# Patient Record
Sex: FEMALE | URBAN - METROPOLITAN AREA
[De-identification: names, ages, dates, MRNs, and addresses within clinical notes are randomized per-mention and may not be internally consistent; named-entity substitution may affect disease eponyms.]

---

## 2020-04-16 ENCOUNTER — EMERGENCY (EMERGENCY)
Facility: HOSPITAL | Age: 65
LOS: 1 days | Discharge: HOME | End: 2020-04-16
Attending: EMERGENCY MEDICINE
Payer: COMMERCIAL

## 2020-04-16 VITALS
RESPIRATION RATE: 18 BRPM | OXYGEN SATURATION: 97 % | SYSTOLIC BLOOD PRESSURE: 199 MMHG | TEMPERATURE: 98 F | HEART RATE: 86 BPM | DIASTOLIC BLOOD PRESSURE: 110 MMHG | WEIGHT: 154.98 LBS | HEIGHT: 64 IN

## 2020-04-16 VITALS
HEART RATE: 76 BPM | RESPIRATION RATE: 19 BRPM | TEMPERATURE: 99 F | DIASTOLIC BLOOD PRESSURE: 82 MMHG | SYSTOLIC BLOOD PRESSURE: 132 MMHG | OXYGEN SATURATION: 98 %

## 2020-04-16 LAB
ALBUMIN SERPL ELPH-MCNC: 4.8 G/DL — SIGNIFICANT CHANGE UP (ref 3.3–5.2)
ALP SERPL-CCNC: 76 U/L — SIGNIFICANT CHANGE UP (ref 40–120)
ALT FLD-CCNC: 17 U/L — SIGNIFICANT CHANGE UP
ANION GAP SERPL CALC-SCNC: 15 MMOL/L — SIGNIFICANT CHANGE UP (ref 5–17)
AST SERPL-CCNC: 19 U/L — SIGNIFICANT CHANGE UP
BASOPHILS # BLD AUTO: 0.04 K/UL — SIGNIFICANT CHANGE UP (ref 0–0.2)
BASOPHILS NFR BLD AUTO: 0.6 % — SIGNIFICANT CHANGE UP (ref 0–2)
BILIRUB SERPL-MCNC: 0.7 MG/DL — SIGNIFICANT CHANGE UP (ref 0.4–2)
BUN SERPL-MCNC: 18 MG/DL — SIGNIFICANT CHANGE UP (ref 8–20)
CALCIUM SERPL-MCNC: 9.7 MG/DL — SIGNIFICANT CHANGE UP (ref 8.6–10.2)
CHLORIDE SERPL-SCNC: 102 MMOL/L — SIGNIFICANT CHANGE UP (ref 98–107)
CO2 SERPL-SCNC: 25 MMOL/L — SIGNIFICANT CHANGE UP (ref 22–29)
CREAT SERPL-MCNC: 0.64 MG/DL — SIGNIFICANT CHANGE UP (ref 0.5–1.3)
EOSINOPHIL # BLD AUTO: 0.06 K/UL — SIGNIFICANT CHANGE UP (ref 0–0.5)
EOSINOPHIL NFR BLD AUTO: 0.8 % — SIGNIFICANT CHANGE UP (ref 0–6)
GLUCOSE SERPL-MCNC: 112 MG/DL — HIGH (ref 70–99)
HCT VFR BLD CALC: 46.1 % — HIGH (ref 34.5–45)
HGB BLD-MCNC: 14.8 G/DL — SIGNIFICANT CHANGE UP (ref 11.5–15.5)
IMM GRANULOCYTES NFR BLD AUTO: 0.1 % — SIGNIFICANT CHANGE UP (ref 0–1.5)
LIDOCAIN IGE QN: 34 U/L — SIGNIFICANT CHANGE UP (ref 22–51)
LYMPHOCYTES # BLD AUTO: 1.32 K/UL — SIGNIFICANT CHANGE UP (ref 1–3.3)
LYMPHOCYTES # BLD AUTO: 18.2 % — SIGNIFICANT CHANGE UP (ref 13–44)
MAGNESIUM SERPL-MCNC: 2.3 MG/DL — SIGNIFICANT CHANGE UP (ref 1.6–2.6)
MCHC RBC-ENTMCNC: 27.1 PG — SIGNIFICANT CHANGE UP (ref 27–34)
MCHC RBC-ENTMCNC: 32.1 GM/DL — SIGNIFICANT CHANGE UP (ref 32–36)
MCV RBC AUTO: 84.3 FL — SIGNIFICANT CHANGE UP (ref 80–100)
MONOCYTES # BLD AUTO: 0.41 K/UL — SIGNIFICANT CHANGE UP (ref 0–0.9)
MONOCYTES NFR BLD AUTO: 5.6 % — SIGNIFICANT CHANGE UP (ref 2–14)
NEUTROPHILS # BLD AUTO: 5.43 K/UL — SIGNIFICANT CHANGE UP (ref 1.8–7.4)
NEUTROPHILS NFR BLD AUTO: 74.7 % — SIGNIFICANT CHANGE UP (ref 43–77)
PLATELET # BLD AUTO: 222 K/UL — SIGNIFICANT CHANGE UP (ref 150–400)
POTASSIUM SERPL-MCNC: 3.9 MMOL/L — SIGNIFICANT CHANGE UP (ref 3.5–5.3)
POTASSIUM SERPL-SCNC: 3.9 MMOL/L — SIGNIFICANT CHANGE UP (ref 3.5–5.3)
PROT SERPL-MCNC: 8 G/DL — SIGNIFICANT CHANGE UP (ref 6.6–8.7)
RBC # BLD: 5.47 M/UL — HIGH (ref 3.8–5.2)
RBC # FLD: 13.6 % — SIGNIFICANT CHANGE UP (ref 10.3–14.5)
SODIUM SERPL-SCNC: 142 MMOL/L — SIGNIFICANT CHANGE UP (ref 135–145)
T4 AB SER-ACNC: 7.5 UG/DL — SIGNIFICANT CHANGE UP (ref 4.5–12)
TROPONIN T SERPL-MCNC: <0.01 NG/ML — SIGNIFICANT CHANGE UP (ref 0–0.06)
TSH SERPL-MCNC: 2.67 UIU/ML — SIGNIFICANT CHANGE UP (ref 0.27–4.2)
WBC # BLD: 7.27 K/UL — SIGNIFICANT CHANGE UP (ref 3.8–10.5)
WBC # FLD AUTO: 7.27 K/UL — SIGNIFICANT CHANGE UP (ref 3.8–10.5)

## 2020-04-16 PROCEDURE — 84436 ASSAY OF TOTAL THYROXINE: CPT

## 2020-04-16 PROCEDURE — 99284 EMERGENCY DEPT VISIT MOD MDM: CPT | Mod: 25

## 2020-04-16 PROCEDURE — 71046 X-RAY EXAM CHEST 2 VIEWS: CPT

## 2020-04-16 PROCEDURE — 84484 ASSAY OF TROPONIN QUANT: CPT

## 2020-04-16 PROCEDURE — 36415 COLL VENOUS BLD VENIPUNCTURE: CPT

## 2020-04-16 PROCEDURE — 80053 COMPREHEN METABOLIC PANEL: CPT

## 2020-04-16 PROCEDURE — 84443 ASSAY THYROID STIM HORMONE: CPT

## 2020-04-16 PROCEDURE — 93010 ELECTROCARDIOGRAM REPORT: CPT

## 2020-04-16 PROCEDURE — 83690 ASSAY OF LIPASE: CPT

## 2020-04-16 PROCEDURE — 71046 X-RAY EXAM CHEST 2 VIEWS: CPT | Mod: 26

## 2020-04-16 PROCEDURE — 99285 EMERGENCY DEPT VISIT HI MDM: CPT

## 2020-04-16 PROCEDURE — 83735 ASSAY OF MAGNESIUM: CPT

## 2020-04-16 PROCEDURE — 85027 COMPLETE CBC AUTOMATED: CPT

## 2020-04-16 PROCEDURE — 93005 ELECTROCARDIOGRAM TRACING: CPT

## 2020-04-16 RX ORDER — ASPIRIN/CALCIUM CARB/MAGNESIUM 324 MG
162 TABLET ORAL ONCE
Refills: 0 | Status: COMPLETED | OUTPATIENT
Start: 2020-04-16 | End: 2020-04-16

## 2020-04-16 RX ORDER — DIAZEPAM 5 MG
5 TABLET ORAL ONCE
Refills: 0 | Status: DISCONTINUED | OUTPATIENT
Start: 2020-04-16 | End: 2020-04-16

## 2020-04-16 RX ADMIN — Medication 162 MILLIGRAM(S): at 07:16

## 2020-04-16 RX ADMIN — Medication 5 MILLIGRAM(S): at 07:16

## 2020-04-16 NOTE — ED ADULT NURSE REASSESSMENT NOTE - COMFORT CARE
side rails up/warm blanket provided/repositioned/plan of care explained/treatment delay explained/wait time explained/po fluids offered

## 2020-04-16 NOTE — ED ADULT NURSE REASSESSMENT NOTE - NS ED NURSE REASSESS COMMENT FT1
Pt received in the stretcher  resting comfortably, no distress noted, no chest pain  reported,  blood pressure improved after Valium , pt states numbness in her L arm subsiding , pt appears comfortable, will contiune to monitor

## 2020-04-16 NOTE — ED PROVIDER NOTE - PHYSICAL EXAMINATION
Const: Awake, alert and oriented. In no acute distress. Well appearing.  HEENT: NC/AT. Moist mucous membranes.  Eyes: No scleral icterus. EOMI.  Neck:. Soft and supple. Full ROM without pain.  Cardiac: Regular rate and regular rhythm. +S1/S2. Peripheral pulses 2+ and symmetric. No LE edema.  Resp: Speaking in full sentences. No evidence of respiratory distress. No wheezes, rales or rhonchi.  Abd: Soft, non-tender, non-distended. Normal bowel sounds in all 4 quadrants. No guarding or rebound.  Msk: Spine midline and non-tender. No CVAT. mild ttp at left posterior shoulder  Skin: No rashes, abrasions or lacerations.  Lymph: No cervical lymphadenopathy.  Neuro: Awake, alert & oriented x 3. Moves all extremities symmetrically. follows commands, motor glenn upper and lower ext 5/5, sensory symm and intact CN 2-12 grossly intact, no ataxia, no nystagmus, no dysmetria, no ddk, symm gelnn, no pronator drift

## 2020-04-16 NOTE — ED ADULT TRIAGE NOTE - CHIEF COMPLAINT QUOTE
Pt BIBA AOx3, presents to ED with left-sided arm pain since sunday, with HTN and HA tonight. States hx of HTN on cardizem, states forgot to take last night's dose so took it at 0300. Denies SOB, chest pain, dizziness , fever, cough or any +Covid-19 contact.

## 2020-04-16 NOTE — ED PROVIDER NOTE - PATIENT PORTAL LINK FT
You can access the FollowMyHealth Patient Portal offered by [facility] by registering at the following website: [email or web address]. By joining [employer]’s FollowMyHealth portal, you will also be able to view your health information using other applications (apps) compatible with our system.

## 2020-04-16 NOTE — ED ADULT NURSE NOTE - OBJECTIVE STATEMENT
Pt complaining of hypertension. Pt says he missed her night med, and took it at 3am. she has herniated discs and would normally see a chiropractor but hasn't because of covid. Pt has chest and left arm  pain.

## 2020-04-16 NOTE — ED PROVIDER NOTE - CLINICAL SUMMARY MEDICAL DECISION MAKING FREE TEXT BOX
63yo female with pmh of HTN, herniated cervical discs and sarcoidosis presents with left arm pain for 4 days.  EKG wnl, sinus, no acute ischemic changes

## 2020-04-16 NOTE — ED PROVIDER NOTE - NS ED ROS FT
Review of Systems:  	•	CONSTITUTIONAL - no fever, no diaphoresis, no weight change  	•	SKIN - no rash  	•	HEMATOLOGIC - no bleeding, no bruising  	•	EYES - no eye pain, no blurred vision  	•	ENT - no change in hearing, no pain  	•	RESPIRATORY - no shortness of breath, no cough  	•	CARDIAC - no chest pain, no palpitations  	•	GI - no abd pain, no nausea, no vomiting, no diarrhea, no constipation, no bleeding  	•	GENITO-URINARY - no vaginal bleeding, no discharge, no dysuria; no hematuria  	•	ENDO - no polydypsia, no polyurea, no heat/no cold intolerance  	•	MUSCULOSKELETAL - +left arm pain, no joint paint, no swelling, no redness  	•	NEUROLOGIC - no weakness, no headache, no anesthesia, no paresthesias  	•	PSYCH - no anxiety, non suicidal, non homicidal, no hallucination, no depression  	•	ALLERGY - no rhinitis

## 2020-04-16 NOTE — ED PROVIDER NOTE - OBJECTIVE STATEMENT
63yo female with pmh of HTN, herniated cervical discs and sarcoidosis presents with left arm pain for 4 days. Pt states she has history of herniated cervical discs and usually gets shots for the pain but has not been able to due to our current state. Pt 4 days ago was carrying a heavy tray of lasagna and started to feel pain after that. Pt continued with intermittent LUE pain radiating down with paresthesias and up to the left neck. Pt denies weakness. Pt this am work up hypertensive when she saw her BP she started to become anxious with palpitations. Pt forgot to take her diltiazem last night but took it pta. Pt denies fevers/chills, ha, loc, focal neuro deficits, cp/sob/palp, cough, abd pain/n/v/d, urinary symptoms, recent travel and sick/COVID contacts.      meds - lisinopril 15mg and diltiazem 300mg, albuterol, symbicort

## 2020-12-13 ENCOUNTER — TRANSCRIPTION ENCOUNTER (OUTPATIENT)
Age: 65
End: 2020-12-13

## 2021-01-19 ENCOUNTER — EMERGENCY (EMERGENCY)
Facility: HOSPITAL | Age: 66
LOS: 1 days | Discharge: HOME | End: 2021-01-19
Attending: EMERGENCY MEDICINE
Payer: COMMERCIAL

## 2021-01-19 VITALS
OXYGEN SATURATION: 97 % | HEART RATE: 94 BPM | WEIGHT: 199.96 LBS | SYSTOLIC BLOOD PRESSURE: 190 MMHG | DIASTOLIC BLOOD PRESSURE: 109 MMHG | RESPIRATION RATE: 18 BRPM | HEIGHT: 64 IN | TEMPERATURE: 98 F

## 2021-01-19 PROCEDURE — 99284 EMERGENCY DEPT VISIT MOD MDM: CPT

## 2021-01-19 PROCEDURE — 70486 CT MAXILLOFACIAL W/O DYE: CPT | Mod: 26

## 2021-01-19 PROCEDURE — 70450 CT HEAD/BRAIN W/O DYE: CPT | Mod: 26

## 2021-01-19 PROCEDURE — 72125 CT NECK SPINE W/O DYE: CPT | Mod: 26

## 2021-01-19 RX ORDER — ACETAMINOPHEN 500 MG
650 TABLET ORAL ONCE
Refills: 0 | Status: COMPLETED | OUTPATIENT
Start: 2021-01-19 | End: 2021-01-19

## 2021-01-19 RX ADMIN — Medication 650 MILLIGRAM(S): at 19:50

## 2021-01-19 NOTE — ED STATDOCS - ATTENDING CONTRIBUTION TO CARE
I, Dr. Maloney, performed the initial face to face bedside interview with this patient regarding history of present illness, review of symptoms and relevant past medical, social and family history.  I completed an independent physical examination.  I was the initial provider who evaluated this patient. I have signed out the follow up of any pending tests (i.e. labs, radiological studies) to the ACP.  I have communicated the patient’s plan of care and disposition with the ACP.

## 2021-01-19 NOTE — ED STATDOCS - PATIENT PORTAL LINK FT
You can access the FollowMyHealth Patient Portal offered by [facility] by registering at the following website: [email or web address]. By joining [employer]’s FollowMyHealth portal, you will also be able to view your health information using other applications (apps) compatible with our system.

## 2021-01-19 NOTE — ED STATDOCS - MUSCULOSKELETAL, MLM
+ right 5th metacarpal ttp, + lateral aspect of patella/ anterior tib fib ttp , and + Mild swelling to right knee
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
## 2021-01-19 NOTE — ED STATDOCS - PROGRESS NOTE DETAILS
PT evaluated by intake physician. HPI/PE/ROS as noted above. Will follow up plan per intake physician. reviewed ct results xray results pt to follow up with pmd pt explained dc instructions

## 2021-01-19 NOTE — ED ADULT TRIAGE NOTE - CHIEF COMPLAINT QUOTE
pt ambulatory into ED a&ox3, no acute distress, breaths even and unlabored c/o mechanical fall while taking out garbage tonight. reports she hit her nose on the concrete. c/o bloody nose, R knee pain and mild head pain. no bleeding noted at this time. superficial abrasions noted to forehead and nose. denies loc. denies blood thinners. denies nausea, vomiting, dizziness. reports did not take her evening BP medications tonight yet.

## 2021-01-20 VITALS
DIASTOLIC BLOOD PRESSURE: 97 MMHG | SYSTOLIC BLOOD PRESSURE: 180 MMHG | RESPIRATION RATE: 17 BRPM | HEART RATE: 80 BPM | OXYGEN SATURATION: 97 % | TEMPERATURE: 98 F

## 2021-01-20 PROBLEM — D86.9 SARCOIDOSIS, UNSPECIFIED: Chronic | Status: ACTIVE | Noted: 2020-04-16

## 2021-01-20 PROBLEM — I10 ESSENTIAL (PRIMARY) HYPERTENSION: Chronic | Status: ACTIVE | Noted: 2020-04-16

## 2021-01-20 PROCEDURE — 99284 EMERGENCY DEPT VISIT MOD MDM: CPT | Mod: 25

## 2021-01-20 PROCEDURE — 72125 CT NECK SPINE W/O DYE: CPT

## 2021-01-20 PROCEDURE — 73564 X-RAY EXAM KNEE 4 OR MORE: CPT

## 2021-01-20 PROCEDURE — 70450 CT HEAD/BRAIN W/O DYE: CPT

## 2021-01-20 PROCEDURE — 70486 CT MAXILLOFACIAL W/O DYE: CPT

## 2021-01-20 PROCEDURE — 73564 X-RAY EXAM KNEE 4 OR MORE: CPT | Mod: 26,RT

## 2021-02-02 ENCOUNTER — TRANSCRIPTION ENCOUNTER (OUTPATIENT)
Age: 66
End: 2021-02-02

## 2021-09-20 ENCOUNTER — TRANSCRIPTION ENCOUNTER (OUTPATIENT)
Age: 66
End: 2021-09-20

## 2021-10-18 ENCOUNTER — APPOINTMENT (OUTPATIENT)
Dept: PULMONOLOGY | Facility: CLINIC | Age: 66
End: 2021-10-18
Payer: COMMERCIAL

## 2021-10-18 VITALS
OXYGEN SATURATION: 97 % | RESPIRATION RATE: 15 BRPM | SYSTOLIC BLOOD PRESSURE: 150 MMHG | HEART RATE: 92 BPM | DIASTOLIC BLOOD PRESSURE: 90 MMHG

## 2021-10-18 VITALS — WEIGHT: 200 LBS | BODY MASS INDEX: 33.32 KG/M2 | HEIGHT: 65 IN

## 2021-10-18 DIAGNOSIS — N63.0 UNSPECIFIED LUMP IN UNSPECIFIED BREAST: ICD-10-CM

## 2021-10-18 DIAGNOSIS — Z01.89 ENCOUNTER FOR OTHER SPECIFIED SPECIAL EXAMINATIONS: ICD-10-CM

## 2021-10-18 DIAGNOSIS — M51.26 OTHER INTERVERTEBRAL DISC DISPLACEMENT, LUMBAR REGION: ICD-10-CM

## 2021-10-18 DIAGNOSIS — Z87.39 PERSONAL HISTORY OF OTHER DISEASES OF THE MUSCULOSKELETAL SYSTEM AND CONNECTIVE TISSUE: ICD-10-CM

## 2021-10-18 DIAGNOSIS — Z82.49 FAMILY HISTORY OF ISCHEMIC HEART DISEASE AND OTHER DISEASES OF THE CIRCULATORY SYSTEM: ICD-10-CM

## 2021-10-18 DIAGNOSIS — D49.7 NEOPLASM OF UNSPECIFIED BEHAVIOR OF ENDOCRINE GLANDS AND OTHER PARTS OF NERVOUS SYSTEM: ICD-10-CM

## 2021-10-18 DIAGNOSIS — U09.9 POST COVID-19 CONDITION, UNSPECIFIED: ICD-10-CM

## 2021-10-18 DIAGNOSIS — R05.9 COUGH, UNSPECIFIED: ICD-10-CM

## 2021-10-18 DIAGNOSIS — R06.00 DYSPNEA, UNSPECIFIED: ICD-10-CM

## 2021-10-18 DIAGNOSIS — Z86.79 PERSONAL HISTORY OF OTHER DISEASES OF THE CIRCULATORY SYSTEM: ICD-10-CM

## 2021-10-18 DIAGNOSIS — Z87.442 PERSONAL HISTORY OF URINARY CALCULI: ICD-10-CM

## 2021-10-18 DIAGNOSIS — M50.20 OTHER CERVICAL DISC DISPLACEMENT, UNSPECIFIED CERVICAL REGION: ICD-10-CM

## 2021-10-18 PROCEDURE — 99204 OFFICE O/P NEW MOD 45 MIN: CPT

## 2021-10-18 RX ORDER — DILTIAZEM HYDROCHLORIDE 300 MG/1
300 TABLET, EXTENDED RELEASE ORAL
Qty: 90 | Refills: 0 | Status: ACTIVE | COMMUNITY
Start: 2021-08-23

## 2021-10-18 RX ORDER — LISINOPRIL AND HYDROCHLOROTHIAZIDE TABLETS 20; 25 MG/1; MG/1
20-25 TABLET ORAL
Qty: 30 | Refills: 0 | Status: ACTIVE | COMMUNITY
Start: 2021-09-13

## 2021-10-18 RX ORDER — ALBUTEROL SULFATE 90 UG/1
108 (90 BASE) INHALANT RESPIRATORY (INHALATION)
Qty: 18 | Refills: 0 | Status: ACTIVE | COMMUNITY
Start: 2021-05-27

## 2021-10-18 RX ORDER — AZELASTINE HYDROCHLORIDE 137 UG/1
137 SPRAY, METERED NASAL
Qty: 30 | Refills: 0 | Status: ACTIVE | COMMUNITY
Start: 2021-07-06

## 2021-10-18 RX ORDER — DIAZEPAM 2 MG/1
2 TABLET ORAL
Qty: 60 | Refills: 0 | Status: ACTIVE | COMMUNITY
Start: 2021-08-31

## 2021-10-18 RX ORDER — MULTIVIT-MIN/FOLIC/VIT K/LYCOP 400-300MCG
500 TABLET ORAL
Qty: 30 | Refills: 0 | Status: ACTIVE | COMMUNITY
Start: 2021-03-15

## 2021-10-18 RX ORDER — BUDESONIDE AND FORMOTEROL FUMARATE DIHYDRATE 160; 4.5 UG/1; UG/1
160-4.5 AEROSOL RESPIRATORY (INHALATION)
Qty: 10 | Refills: 0 | Status: ACTIVE | COMMUNITY
Start: 2021-05-24

## 2021-10-18 NOTE — PHYSICAL EXAM
[No Acute Distress] : no acute distress [Normal Oropharynx] : normal oropharynx [Normal Appearance] : normal appearance [Normal Rate/Rhythm] : normal rate/rhythm [Normal S1, S2] : normal s1, s2 [No Resp Distress] : no resp distress [No Acc Muscle Use] : no acc muscle use [Normal Rhythm and Effort] : normal rhythm and effort [Clear to Auscultation Bilaterally] : clear to auscultation bilaterally [Normal Gait] : normal gait [Normal Color/ Pigmentation] : normal color/ pigmentation [Oriented x3] : oriented x3 [Normal Mood] : normal mood [Normal Affect] : normal affect

## 2021-10-20 NOTE — ASSESSMENT
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 Underlying sarcoid, asthma. WHITTEN worse than precovid that is not improving. She would benefit from pulmonary rehab.

## 2021-10-20 NOTE — HISTORY OF PRESENT ILLNESS
[Former] : former [< 30 pack-years] : < 30 pack-years [TextBox_4] : Hx sarcoid dx in 1983. Was being f/u at [facility] initially then saw Dr Gisell Molina in [place] for a long time. Doing PFTs. No treatment needed for sarcoid. Had CTs in the past. Mainly goes to [facility]. \par \par Hx asthma. On symbicort and albuterol prn before covid. \par \par Had COVID infection in Feb 2021. Was hospitalized in [place] 2/12-2/16/21; received remdesivir, decadron, Vit C. D/C on O2. Weaned off. Hospitalized in March for PE. On eliquis until 8/17/21. \par \par Saw heme at [facility]. Told ok to stop eliquis. \par \par Still gets WHITTEN since COVID. Much worse since COVID. Was not occurring prior to COVID. Still with cough worse since covid. \par \par Had cardiac eval in 2020; all ok. ECHO done in [facility] told ok. Sees Dr Leo at [facility] in [place].

## 2022-01-31 ENCOUNTER — OUTPATIENT (OUTPATIENT)
Dept: OUTPATIENT SERVICES | Facility: HOSPITAL | Age: 67
LOS: 1 days | Discharge: HOME | End: 2022-01-31

## 2022-01-31 DIAGNOSIS — C50.919 MALIGNANT NEOPLASM OF UNSPECIFIED SITE OF UNSPECIFIED FEMALE BREAST: ICD-10-CM

## 2022-02-01 ENCOUNTER — NON-APPOINTMENT (OUTPATIENT)
Age: 67
End: 2022-02-01

## 2022-02-02 ENCOUNTER — APPOINTMENT (OUTPATIENT)
Dept: HEMATOLOGY ONCOLOGY | Facility: CLINIC | Age: 67
End: 2022-02-02
Payer: COMMERCIAL

## 2022-02-02 VITALS
WEIGHT: 206 LBS | OXYGEN SATURATION: 96 % | DIASTOLIC BLOOD PRESSURE: 91 MMHG | BODY MASS INDEX: 34.32 KG/M2 | SYSTOLIC BLOOD PRESSURE: 152 MMHG | HEIGHT: 65 IN | HEART RATE: 84 BPM

## 2022-02-02 PROCEDURE — 99205 OFFICE O/P NEW HI 60 MIN: CPT

## 2022-02-02 NOTE — CONSULT LETTER
[Dear  ___] : Dear  [unfilled], [Please see my note below.] : Please see my note below. [Sincerely,] : Sincerely, [Courtesy Letter:] : I had the pleasure of seeing your patient, [unfilled], in my office today. [FreeTextEntry3] : Zoraida Alonzo MD\par Medical Oncology/Hematology\par [facility] [place], [facility]\par \par \par [facility]\par

## 2022-02-02 NOTE — ASSESSMENT
[FreeTextEntry1] : 66 year old female diagnosed with right breast ADH in 6/2021. S/p right breast lumpectomy and left breast reduction mammoplasty on 1/19/21 and was incidentally noted to have bilateral breast cancer.\par \par 1. Right breast lumpectomy 9:00- IDC, moderately differentiated,  9mm. ER >90% IN Negative HER2 Negative Ki-67 20% \par 2. Left breast, reduction, mammoplasty- 2 foci of Invasive lobular carcinoma, moderately differentiated, measuring 18mm and 15 mm, both are ER >90% IN 80% HER2 Negative Ki-67 10%. Margins cannot be assessed.\par \par \par Today we discussed the available radiographic and pathologic data in detail. We also discussed the natural history of the disease, as well as management options. Discussed two different subtypes of breast cancer in each breast.\par We discussed role of Oncotype Dx based on final surgical pathology to ascertain benefit of chemotherapy.\par Discussed the role of endocrine therapy in reducing recurrence risk. Aromatase inhibitors, side effects include but not limited to hot flashes, vaginal dryness, arthralgia/musculoskeletal pain and decrease in bone density- can cause osteopenia/osteoporosis. Alternatively discussed Tamoxifen, side effects can include but are not limited to thromboembolic events and the risk of endometrial cancer (GYN evaluation 6 months- 1 year) \par \par Plan:\par MRI breast pending for 2/4/22\par Will return to OR for SNL excision bilaterally\par Oncotype Dx following SNL excision\par Genetic testing, to be done by Dr. Elliott per patient\par Follow up with Dr. Elliott\par Follow up in 4 weeks to review final surgical pathology and finalize treatment plan

## 2022-02-02 NOTE — HISTORY OF PRESENT ILLNESS
[de-identified] : Ms. Olguin is a 66 year old female diagnosed with right breast ADH in 2021 at age 65. \par \par Patient obtained screening mammogram on 6/15/21 demonstrating lobulated nodule in the right breast laterally, corresponding to a complex cyst. An intraductal nodule on the right is noted with recommendation for biopsy. On 21 underwent right breast retroareolar biopsy demonstrating portion of sclerosing intraductal papilloma; additional biopsy of right breast 9:00 10cm FN demonstrating portion of dilated duct with ADH, micro-papillary pattern\par \par 21 Underwent bilateral breast surgery\par A. Right breast, lateral tissue, 9 o'clock, lumpectomy- IDC, moderately differentiated, measuring approximately 9mm. DCIS, intermediate nuclear grade, measuring approximately 20mm. Negative for lymphovascular invasion. Margins are negative for carcinoma. Both IDC and DCIS ER >90% MS Negative HER2 Negative Ki-67 20% \par B. Right breast, lateral margin, excision- benign\par C. Left breast, reduction, mammoplasty- Invasive lobular carcinoma (block #C3), moderately differentiated, measuring 18mm, negative for lymphovascular invasion ER >90% MS 80% HER2 Negative Ki-67 10%. Margins cannot be assessed. There is a second focus of invasive lobular carcinoma (block # C5), measuring 15mm, ER > 90% MS 70% HER2 Negative Ki-67 10%\par D. Right breast tissue, excision- benign with extensive sclerosing adenosis with microcalcifications.\par \par Denies MRI breast prior to surgery \par Notes prior to right breast excision, requested bilateral breast reduction with incidental finding of left breast malignant findings\par Recalls presented at tumor board at [facility], plan is for MRI and sentinel lymph node dissection in 2 weeks followed by possible surgical intervention followed by RT \par Evaluated by oncologist at [facility] who advised endocrine therapy \par \par Denies history of osteopenia/osteoporosis. Recalls last DEXA was a couple of years ago at [facility] in [place], [place].\par PMH: Hyperlipidemia, hypertension, Sarcoidosis, herniated disc of neck and back, Arthritis, COVID-19 pneumonia 2021 subsequent found to have bilateral PE (started on Eliquis then discontinued after PE resolved) \par FH: History of breast cancer of: Mother  at 77 due to emphysema, maternal great grandmother, maternal first cousin\par PCP: Dr. Abdalla/PREM Vu ([facility]) \par Pulmonologist: Dr. Meza (pulmonologist)

## 2022-02-10 ENCOUNTER — RESULT REVIEW (OUTPATIENT)
Age: 67
End: 2022-02-10

## 2022-02-10 LAB — SURGICAL PATHOLOGY STUDY: SIGNIFICANT CHANGE UP

## 2022-02-15 ENCOUNTER — NON-APPOINTMENT (OUTPATIENT)
Age: 67
End: 2022-02-15

## 2022-03-07 ENCOUNTER — OUTPATIENT (OUTPATIENT)
Dept: OUTPATIENT SERVICES | Facility: HOSPITAL | Age: 67
LOS: 1 days | Discharge: HOME | End: 2022-03-07

## 2022-03-07 DIAGNOSIS — C50.919 MALIGNANT NEOPLASM OF UNSPECIFIED SITE OF UNSPECIFIED FEMALE BREAST: ICD-10-CM

## 2022-03-08 ENCOUNTER — APPOINTMENT (OUTPATIENT)
Dept: HEMATOLOGY ONCOLOGY | Facility: CLINIC | Age: 67
End: 2022-03-08
Payer: COMMERCIAL

## 2022-03-08 VITALS
WEIGHT: 207 LBS | SYSTOLIC BLOOD PRESSURE: 167 MMHG | HEART RATE: 75 BPM | DIASTOLIC BLOOD PRESSURE: 76 MMHG | BODY MASS INDEX: 34.49 KG/M2 | HEIGHT: 65 IN | OXYGEN SATURATION: 97 %

## 2022-03-08 PROCEDURE — 99214 OFFICE O/P EST MOD 30 MIN: CPT

## 2022-03-08 NOTE — HISTORY OF PRESENT ILLNESS
[de-identified] : Ms. Olguin is a 66 year old female diagnosed with right breast ADH in 2021 at age 65. \par \par Patient obtained screening mammogram on 6/15/21 demonstrating lobulated nodule in the right breast laterally, corresponding to a complex cyst. An intraductal nodule on the right is noted with recommendation for biopsy. On 21 underwent right breast retroareolar biopsy demonstrating portion of sclerosing intraductal papilloma; additional biopsy of right breast 9:00 10cm FN demonstrating portion of dilated duct with ADH, micro-papillary pattern\par \par 21 Underwent bilateral breast surgery\par A. Right breast, lateral tissue, 9 o'clock, lumpectomy- IDC, moderately differentiated, measuring approximately 9mm. DCIS, intermediate nuclear grade, measuring approximately 20mm. Negative for lymphovascular invasion. Margins are negative for carcinoma. Both IDC and DCIS ER >90% VA Negative HER2 Negative Ki-67 20% \par B. Right breast, lateral margin, excision- benign\par C. Left breast, reduction, mammoplasty- Invasive lobular carcinoma (block #C3), moderately differentiated, measuring 18mm, negative for lymphovascular invasion ER >90% VA 80% HER2 Negative Ki-67 10%. Margins cannot be assessed. There is a second focus of invasive lobular carcinoma (block # C5), measuring 15mm, ER > 90% VA 70% HER2 Negative Ki-67 10%\par D. Right breast tissue, excision- benign with extensive sclerosing adenosis with microcalcifications.\par \par Denies MRI breast prior to surgery \par Notes prior to right breast excision, requested bilateral breast reduction with incidental finding of left breast malignant findings\par Recalls presented at tumor board at [facility], plan is for MRI and sentinel lymph node dissection in 2 weeks followed by possible surgical intervention followed by RT \par Evaluated by oncologist at [facility] who advised endocrine therapy \par \par Denies history of osteopenia/osteoporosis. Recalls last DEXA was a couple of years ago at [facility] in [place], [place].\par PMH: Hyperlipidemia, hypertension, Sarcoidosis, herniated disc of neck and back, Arthritis, COVID-19 pneumonia 2021 subsequent found to have bilateral PE (started on Eliquis then discontinued after PE resolved) \par FH: History of breast cancer of: Mother  at 77 due to emphysema, maternal great grandmother, maternal first cousin\par PCP: Dr. Abdalla/PREM Vu ([facility]) \par Pulmonologist: Dr. Meza (pulmonologist)  [de-identified] : Patient presents today for follow-up. \par Overall feeling well\par Denies pain in breasts \par B/l SNLB planned for 03/16/2022. Deferring mastectomy at this time. \par 02/04/22 MRI Breast: Bilateral large postsurgical seromas. No MRI evidence of malignancy. \par \par Insurances: [employer] and Medicare \par \par FMHx: Breast Cancer: Mother (age 62), M Grandmother

## 2022-03-08 NOTE — ADDENDUM
[FreeTextEntry1] : Documented by Edmundo Jimenez acting as scribe for Dr. Alonzo on 03/08/2022. \par \par All Medical record entries made by the Scribe were at my, Dr. Alonzo's, direction and personally dictated by me on 03/08/2022. I have reviewed the chart and agree that the record accurately reflects my personal performance of the history, physical exam, assessment and plan. I have also personally directed, reviewed, and agreed with the discharge instructions.

## 2022-03-08 NOTE — ASSESSMENT
[FreeTextEntry1] : 66 year old female diagnosed with right breast ADH in 6/2021. S/p right breast lumpectomy and left breast reduction mammoplasty on 1/19/21 and was incidentally noted to have bilateral breast cancer.\par \par 1. Right breast lumpectomy 9:00- IDC, moderately differentiated,  9mm. ER >90% [place] Negative HER2 Negative Ki-67 20% \par 2. Left breast, reduction, mammoplasty- 2 foci of Invasive lobular carcinoma, moderately differentiated, measuring 18mm and 15 mm, both are ER >90% NJ 80% HER2 Negative Ki-67 10%. Margins cannot be assessed.\par \par \par We discussed the available radiographic and pathologic data in detail. We also discussed the natural history of the disease, as well as management options. Discussed two different subtypes of breast cancer in each breast.\par We discussed role of Oncotype Dx based on final surgical pathology to ascertain benefit of chemotherapy.\par Discussed the role of endocrine therapy in reducing recurrence risk. Aromatase inhibitors, side effects include but not limited to hot flashes, vaginal dryness, arthralgia/musculoskeletal pain and decrease in bone density- can cause osteopenia/osteoporosis. Alternatively discussed Tamoxifen, side effects can include but are not limited to thromboembolic events and the risk of endometrial cancer (GYN evaluation 6 months- 1 year) \par \par B/l SNLB planned for 03/16/2022. \par 02/04/22 MRI Breast: Bilateral large postsurgical seromas. No MRI evidence of malignancy. \par \par Plan:\par B/l SNL planned for 03/16/2022\par Oncotype Dx following SNL excision\par Genetic testing, to be done by Dr. Elliott per patient\par Follow up with Dr. Elliott\par Follow up in 4 weeks to review final surgical pathology and finalize treatment plan

## 2022-03-08 NOTE — CONSULT LETTER
[Dear  ___] : Dear  [unfilled], [Courtesy Letter:] : I had the pleasure of seeing your patient, [unfilled], in my office today. [Please see my note below.] : Please see my note below. [Sincerely,] : Sincerely, [FreeTextEntry3] : Zoraida Alonzo MD\par Medical Oncology/Hematology\par [facility] [place], [facility]\par \par \par [facility] at [facility]\par

## 2022-04-15 ENCOUNTER — OUTPATIENT (OUTPATIENT)
Dept: OUTPATIENT SERVICES | Facility: HOSPITAL | Age: 67
LOS: 1 days | Discharge: HOME | End: 2022-04-15

## 2022-04-15 DIAGNOSIS — C50.919 MALIGNANT NEOPLASM OF UNSPECIFIED SITE OF UNSPECIFIED FEMALE BREAST: ICD-10-CM

## 2022-04-21 ENCOUNTER — APPOINTMENT (OUTPATIENT)
Dept: HEMATOLOGY ONCOLOGY | Facility: CLINIC | Age: 67
End: 2022-04-21
Payer: COMMERCIAL

## 2022-04-21 VITALS
HEART RATE: 74 BPM | HEIGHT: 65 IN | WEIGHT: 211 LBS | DIASTOLIC BLOOD PRESSURE: 92 MMHG | BODY MASS INDEX: 35.16 KG/M2 | OXYGEN SATURATION: 98 % | SYSTOLIC BLOOD PRESSURE: 164 MMHG

## 2022-04-21 PROCEDURE — 99214 OFFICE O/P EST MOD 30 MIN: CPT

## 2022-04-21 NOTE — CONSULT LETTER
[Dear  ___] : Dear  [unfilled], [Courtesy Letter:] : I had the pleasure of seeing your patient, [unfilled], in my office today. [Please see my note below.] : Please see my note below. [Sincerely,] : Sincerely, [FreeTextEntry3] : Zoraida Alonzo MD\par Medical Oncology/Hematology\par [facility] [place], [facility]\par \par \par [facility] at [facility]\par

## 2022-04-21 NOTE — ASSESSMENT
[FreeTextEntry1] : 66 year old female diagnosed with right breast ADH in 6/2021. S/p right breast lumpectomy and left breast reduction mammoplasty on 1/19/21 and was incidentally noted to have bilateral breast cancer.\par \par 1. Right breast lumpectomy 9:00- IDC, moderately differentiated,  9mm. ER >90% VT Negative HER2 Negative Ki-67 20% \par 2. Left breast, reduction, mammoplasty- 2 foci of Invasive lobular carcinoma, moderately differentiated, measuring 18mm and 15 mm, both are ER >90% VT 80% HER2 Negative Ki-67 10%. Margins cannot be assessed.\par \par S/p B/l SNLB 03/23/2022 - Reviewed pathology. 0/2 right axillary SNL. 0/1 Left axillary SNL\par \par Oncotype Dx RS is still pending. \par Discussed the role of endocrine therapy in reducing recurrence risk. Aromatase inhibitors, side effects include but not limited to hot flashes, vaginal dryness, arthralgia/musculoskeletal pain and decrease in bone density- can cause osteopenia/osteoporosis. Alternatively discussed Tamoxifen, side effects can include but are not limited to thromboembolic events and the risk of endometrial cancer (GYN evaluation 6 months- 1 year) \par \par \par \par \par Plan:\par Oncotype Dx pending, will call with results \par Baseline DEXA ordered \par Genetic testing, to be done by Dr. Elliott per patient\par Follow up with Dr. Elliott\par Rad-Onc referral\par

## 2022-04-21 NOTE — HISTORY OF PRESENT ILLNESS
[de-identified] : Ms. Olguin is a 66 year old female diagnosed with right breast ADH in 2021 at age 65. \par \par Patient obtained screening mammogram on 6/15/21 demonstrating lobulated nodule in the right breast laterally, corresponding to a complex cyst. An intraductal nodule on the right is noted with recommendation for biopsy. On 21 underwent right breast retroareolar biopsy demonstrating portion of sclerosing intraductal papilloma; additional biopsy of right breast 9:00 10cm FN demonstrating portion of dilated duct with ADH, micro-papillary pattern\par \par 21 Underwent bilateral breast surgery\par A. Right breast, lateral tissue, 9 o'clock, lumpectomy- IDC, moderately differentiated, measuring approximately 9mm. DCIS, intermediate nuclear grade, measuring approximately 20mm. Negative for lymphovascular invasion. Margins are negative for carcinoma. Both IDC and DCIS ER >90% OK Negative HER2 Negative Ki-67 20% \par B. Right breast, lateral margin, excision- benign\par C. Left breast, reduction, mammoplasty- Invasive lobular carcinoma (block #C3), moderately differentiated, measuring 18mm, negative for lymphovascular invasion ER >90% OK 80% HER2 Negative Ki-67 10%. Margins cannot be assessed. There is a second focus of invasive lobular carcinoma (block # C5), measuring 15mm, ER > 90% OK 70% HER2 Negative Ki-67 10%\par D. Right breast tissue, excision- benign with extensive sclerosing adenosis with microcalcifications.\par \par Denies MRI breast prior to surgery \par Notes prior to right breast excision, requested bilateral breast reduction with incidental finding of left breast malignant findings\par Recalls presented at tumor board at [facility], plan is for MRI and sentinel lymph node dissection in 2 weeks followed by possible surgical intervention followed by RT \par Evaluated by oncologist at [facility] who advised endocrine therapy \par \par Denies history of osteopenia/osteoporosis. Recalls last DEXA was a couple of years ago at [facility] in [place], [place].\par PMH: Hyperlipidemia, hypertension, Sarcoidosis, herniated disc of neck and back, Arthritis, COVID-19 pneumonia 2021 subsequent found to have bilateral PE (started on Eliquis then discontinued after PE resolved) \par FH: History of breast cancer of: Mother  at 77 due to emphysema, maternal great grandmother, maternal first cousin\par PCP: Dr. Abdalla/PREM Vu ([facility]) \par Pulmonologist: Dr. Meza (pulmonologist)  [de-identified] : Patient presents today for follow-up. \par S/p bilateral SNLB on 3/23/22 at [facility]\par Overall feeling well\par Denies fever\par Denies pain in breasts \par Denies history of osteopenia/osteoporosis \par \par \par \par FMHx: Breast Cancer: Mother (age 62), M Grandmother \par \par 3/23/22 Pathology: R Axillary Lymph Node (0/2). L Axillary Lymph Node: No metastasis seen in 1 lymph node. Pending confirmation.  \par \par 02/04/22 MRI Breast: Bilateral large postsurgical seromas. No MRI evidence of malignancy.

## 2022-05-03 ENCOUNTER — APPOINTMENT (OUTPATIENT)
Dept: RADIATION ONCOLOGY | Facility: CLINIC | Age: 67
End: 2022-05-03
Payer: COMMERCIAL

## 2022-05-03 ENCOUNTER — OUTPATIENT (OUTPATIENT)
Dept: OUTPATIENT SERVICES | Facility: HOSPITAL | Age: 67
LOS: 1 days | Discharge: HOME | End: 2022-05-03
Payer: MEDICARE

## 2022-05-03 VITALS
RESPIRATION RATE: 16 BRPM | SYSTOLIC BLOOD PRESSURE: 160 MMHG | WEIGHT: 210 LBS | HEART RATE: 79 BPM | BODY MASS INDEX: 34.95 KG/M2 | OXYGEN SATURATION: 97 % | DIASTOLIC BLOOD PRESSURE: 94 MMHG

## 2022-05-03 DIAGNOSIS — D86.0 SARCOIDOSIS OF LUNG: ICD-10-CM

## 2022-05-03 DIAGNOSIS — R91.8 OTHER NONSPECIFIC ABNORMAL FINDING OF LUNG FIELD: ICD-10-CM

## 2022-05-03 DIAGNOSIS — Z86.711 PERSONAL HISTORY OF PULMONARY EMBOLISM: ICD-10-CM

## 2022-05-03 DIAGNOSIS — J12.82 COVID-19: ICD-10-CM

## 2022-05-03 DIAGNOSIS — Z87.891 PERSONAL HISTORY OF NICOTINE DEPENDENCE: ICD-10-CM

## 2022-05-03 DIAGNOSIS — Z80.3 FAMILY HISTORY OF MALIGNANT NEOPLASM OF BREAST: ICD-10-CM

## 2022-05-03 DIAGNOSIS — U07.1 COVID-19: ICD-10-CM

## 2022-05-03 DIAGNOSIS — Z98.890 OTHER SPECIFIED POSTPROCEDURAL STATES: ICD-10-CM

## 2022-05-03 PROCEDURE — 99204 OFFICE O/P NEW MOD 45 MIN: CPT | Mod: 25

## 2022-05-03 PROCEDURE — 77263 THER RADIOLOGY TX PLNG CPLX: CPT

## 2022-05-04 PROBLEM — Z86.711 HISTORY OF PULMONARY EMBOLISM: Status: RESOLVED | Noted: 2021-10-18 | Resolved: 2022-05-04

## 2022-05-04 PROBLEM — Z87.891 FORMER TRIVIAL CIGARETTE SMOKER (LESS THAN 1 PER DAY): Status: ACTIVE | Noted: 2021-10-18

## 2022-05-04 PROBLEM — U07.1 PNEUMONIA DUE TO COVID-19 VIRUS: Status: RESOLVED | Noted: 2021-10-18 | Resolved: 2022-05-04

## 2022-05-04 PROBLEM — D86.0 SARCOIDOSIS OF LUNG: Status: ACTIVE | Noted: 2021-10-18

## 2022-05-04 PROBLEM — Z98.890 HISTORY OF BREAST BIOPSY: Status: ACTIVE | Noted: 2021-10-18

## 2022-05-04 PROBLEM — Z80.3 FAMILY HISTORY OF MALIGNANT NEOPLASM OF BREAST: Status: ACTIVE | Noted: 2021-10-18

## 2022-05-04 PROBLEM — R91.8 MASS OF LUNG: Status: RESOLVED | Noted: 2021-10-18 | Resolved: 2022-05-04

## 2022-05-04 NOTE — PHYSICAL EXAM
[Obese] : obese [Normal] : normal skin color and pigmentation and no rash [de-identified] : well healed breast and axillary scars.

## 2022-05-04 NOTE — REVIEW OF SYSTEMS
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

## 2022-05-04 NOTE — HISTORY OF PRESENT ILLNESS
[FreeTextEntry1] : This 66 year-old female presents for radiation medicine consultation accompanied by her  Iraj.  Ms. Olguin was diagnosed with bilateral breast cancer on 1/19/2022\par \par 6/15/2021 -- Screening mammogram\par Demonstrated lobulated nodule in the right breast laterally, corresponding to a complex cyst.  An intraductal nodule on the right was noted with recommendation for biopsy. \par \par 6/28/21-- Right breast retroareolar biopsy \par Demonstrated portion of sclerosing intraductal papilloma; additional biopsy of right breast 9:00 10cm FN demonstrated portion of dilated duct with ADH, micro-papillary pattern.\par \par 1/19/22  Bilateral breast surgery\par A. Right breast, lateral tissue, 9 o'clock, lumpectomy- IDC, moderately differentiated, measuring approximately 9mm. DCIS, intermediate nuclear grade, measuring approximately 20mm. Negative for lymphovascular invasion. Margins are negative for carcinoma. Both IDC and DCIS ER >90% [place] Negative HER2 Negative Ki-67 20% \par \par B. Right breast, lateral margin excision benign\par \par C. Left breast, reduction, mammoplasty- Invasive lobular carcinoma (block #C3), moderately differentiated, measuring 18mm, negative for lymphovascular invasion ER >90% AZ 80% HER2 Negative Ki-67 10%. Margins cannot be assessed. There is a second focus of invasive lobular carcinoma (block # C5), measuring 15mm, ER > 90% AZ 70% HER2 Negative Ki-67 10%\par D. Right breast tissue, excision- benign with extensive sclerosing adenosis with microcalcifications.\par \par 3/23/2022 SLNB, bilateral axilla\par Right axillary LN's o/2, Left axillary LN's 0/1\par \par Patient follows with her breast surgeon Dr. Elliott at [facility]

## 2022-05-04 NOTE — LETTER GREETING
[Dear Doctor] : Dear Doctor, [Consult Letter:] : Your patient, [unfilled] was seen in my office today for consultation. [Please see my note below.] : Please see my note below. [FreeTextEntry2] : Zoraida Alonzo MD

## 2022-05-04 NOTE — DISEASE MANAGEMENT
[Pathological] : TNM Stage: p [I] : I [FreeTextEntry4] : Right breast --Invasive ductal carcinoma with DCIS, Invasive lobular carcinoma Left breast, both ER+  [TTNM] : 1c [NTNM] : 0 [MTNM] : 0

## 2022-05-09 ENCOUNTER — RESULT REVIEW (OUTPATIENT)
Age: 67
End: 2022-05-09

## 2022-05-09 ENCOUNTER — APPOINTMENT (OUTPATIENT)
Dept: HEMATOLOGY ONCOLOGY | Facility: CLINIC | Age: 67
End: 2022-05-09
Payer: COMMERCIAL

## 2022-05-09 VITALS
HEIGHT: 65 IN | SYSTOLIC BLOOD PRESSURE: 154 MMHG | BODY MASS INDEX: 34.99 KG/M2 | WEIGHT: 210 LBS | DIASTOLIC BLOOD PRESSURE: 85 MMHG | HEART RATE: 93 BPM | OXYGEN SATURATION: 96 %

## 2022-05-09 DIAGNOSIS — T45.1X5A OTHER SPECIFIED NONSCARRING HAIR LOSS: ICD-10-CM

## 2022-05-09 DIAGNOSIS — C50.911 MALIGNANT NEOPLASM OF UNSPECIFIED SITE OF RIGHT FEMALE BREAST: ICD-10-CM

## 2022-05-09 DIAGNOSIS — L65.8 OTHER SPECIFIED NONSCARRING HAIR LOSS: ICD-10-CM

## 2022-05-09 DIAGNOSIS — C50.912 MALIGNANT NEOPLASM OF UNSPECIFIED SITE OF LEFT FEMALE BREAST: ICD-10-CM

## 2022-05-09 LAB
BASOPHILS # BLD AUTO: 0.1 K/UL — SIGNIFICANT CHANGE UP (ref 0–0.2)
BASOPHILS NFR BLD AUTO: 0.6 % — SIGNIFICANT CHANGE UP (ref 0–2)
EOSINOPHIL # BLD AUTO: 0.1 K/UL — SIGNIFICANT CHANGE UP (ref 0–0.5)
EOSINOPHIL NFR BLD AUTO: 0.7 % — SIGNIFICANT CHANGE UP (ref 0–6)
HCT VFR BLD CALC: 44.9 % — SIGNIFICANT CHANGE UP (ref 34.5–45)
HGB BLD-MCNC: 14.5 G/DL — SIGNIFICANT CHANGE UP (ref 11.5–15.5)
LYMPHOCYTES # BLD AUTO: 1.6 K/UL — SIGNIFICANT CHANGE UP (ref 1–3.3)
LYMPHOCYTES # BLD AUTO: 17.2 % — SIGNIFICANT CHANGE UP (ref 13–44)
MCHC RBC-ENTMCNC: 27.6 PG — SIGNIFICANT CHANGE UP (ref 27–34)
MCHC RBC-ENTMCNC: 32.3 G/DL — SIGNIFICANT CHANGE UP (ref 32–36)
MCV RBC AUTO: 85.5 FL — SIGNIFICANT CHANGE UP (ref 80–100)
MONOCYTES # BLD AUTO: 0.6 K/UL — SIGNIFICANT CHANGE UP (ref 0–0.9)
MONOCYTES NFR BLD AUTO: 6.3 % — SIGNIFICANT CHANGE UP (ref 2–14)
NEUTROPHILS # BLD AUTO: 7 K/UL — SIGNIFICANT CHANGE UP (ref 1.8–7.4)
NEUTROPHILS NFR BLD AUTO: 75.1 % — SIGNIFICANT CHANGE UP (ref 43–77)
PLATELET # BLD AUTO: 330 K/UL — SIGNIFICANT CHANGE UP (ref 150–400)
RBC # BLD: 5.25 M/UL — HIGH (ref 3.8–5.2)
RBC # FLD: 14.2 % — SIGNIFICANT CHANGE UP (ref 10.3–14.5)
WBC # BLD: 9.4 K/UL — SIGNIFICANT CHANGE UP (ref 3.8–10.5)
WBC # FLD AUTO: 9.4 K/UL — SIGNIFICANT CHANGE UP (ref 3.8–10.5)

## 2022-05-09 PROCEDURE — 99215 OFFICE O/P EST HI 40 MIN: CPT

## 2022-05-09 NOTE — ASSESSMENT
[FreeTextEntry1] : 66 year old female diagnosed with right breast ADH in 6/2021. S/p right breast lumpectomy and left breast reduction mammoplasty on 1/19/21 and was incidentally noted to have bilateral breast cancer.\par \par 1. Right breast lumpectomy 9:00- IDC, moderately differentiated,  9mm. ER >90% SC Negative HER2 Negative Ki-67 20%. Oncotype DX RS is 27 with greater than 15% CT benefit. Distance recurrence risk at at 9 years is 16%\par \par 2. Left breast, reduction, mammoplasty- 2 foci of Invasive lobular carcinoma, moderately differentiated, measuring 18mm and 15 mm, both are ER >90% SC 80% HER2 Negative Ki-67 10%. Margins cannot be assessed.\par \par S/p B/l SNLB 03/23/2022 - Reviewed pathology. 0/2 right axillary SNL. 0/1 Left axillary SNL\par \par Discussed Oncotype Dx of 27, with >15% benefit from chemotherapy, and 16% risk of recurrence at 9 years with Liu alone.  Discussed Taxotere + Cytoxan every 3 weeks x 4 cycles with Onpro support. We reviewed side effects of chemotherapy to include but not limited to fatigue, nausea, vomiting, diarrhea, constipation, liver/kidney dysfunction, neutropenia, myelosuppression, risk of permanent alopecia, increased risk of infections, neuropathy. She is interested in cold-caps.  Alternatively - dd AC-T was also discussed. \par \par  Also discussed r role of endocrine therapy in reducing recurrence risk. Aromatase inhibitors, side effects include but not limited to hot flashes, vaginal dryness, arthralgia/musculoskeletal pain and decrease in bone density- can cause osteopenia/osteoporosis. \par \par Plan:\par Chemotherapy teaching provided by RN\par Patient is hesitant to start chemotherapy, will call office in 2-3 days with final decision.\par Baseline DEXA ordered- reminded patient\par Pre-chemo labs today\par F/u 10 days s/p cycle 1 \par

## 2022-05-09 NOTE — ADDENDUM
[FreeTextEntry1] : Documented by Rachel Lauren acting as scribe for  on 05/09/2022 \par \par All Medical record entries made by the Scribe were at my, 's , direction and personally dictated by me on 05/09/2022 . I have reviewed the chart and agree that the record accurately reflects my personal performance of the history, physical exam, assessment and plan. I have also personally directed, reviewed, and agreed with the discharge instructions.\par

## 2022-05-09 NOTE — HISTORY OF PRESENT ILLNESS
[de-identified] : Ms. Olguin is a 66 year old female diagnosed with right breast ADH in 2021 at age 65. \par \par Patient obtained screening mammogram on 6/15/21 demonstrating lobulated nodule in the right breast laterally, corresponding to a complex cyst. An intraductal nodule on the right is noted with recommendation for biopsy. On 21 underwent right breast retroareolar biopsy demonstrating portion of sclerosing intraductal papilloma; additional biopsy of right breast 9:00 10cm FN demonstrating portion of dilated duct with ADH, micro-papillary pattern\par \par 21 Underwent bilateral breast surgery\par A. Right breast, lateral tissue, 9 o'clock, lumpectomy- IDC, moderately differentiated, measuring approximately 9mm. DCIS, intermediate nuclear grade, measuring approximately 20mm. Negative for lymphovascular invasion. Margins are negative for carcinoma. Both IDC and DCIS ER >90% LA Negative HER2 Negative Ki-67 20% \par B. Right breast, lateral margin, excision- benign\par C. Left breast, reduction, mammoplasty- Invasive lobular carcinoma (block #C3), moderately differentiated, measuring 18mm, negative for lymphovascular invasion ER >90% LA 80% HER2 Negative Ki-67 10%. Margins cannot be assessed. There is a second focus of invasive lobular carcinoma (block # C5), measuring 15mm, ER > 90% LA 70% HER2 Negative Ki-67 10%\par D. Right breast tissue, excision- benign with extensive sclerosing adenosis with microcalcifications.\par \par Denies MRI breast prior to surgery \par Notes prior to right breast excision, requested bilateral breast reduction with incidental finding of left breast malignant findings\par Recalls presented at tumor board at [facility], plan is for MRI and sentinel lymph node dissection in 2 weeks followed by possible surgical intervention followed by RT \par Evaluated by oncologist at [facility] who advised endocrine therapy \par \par Denies history of osteopenia/osteoporosis. Recalls last DEXA was a couple of years ago at [facility] in [place], [place].\par PMH: Hyperlipidemia, hypertension, Sarcoidosis, herniated disc of neck and back, Arthritis, COVID-19 pneumonia 2021 subsequent found to have bilateral PE (started on Eliquis then discontinued after PE resolved) \par FH: History of breast cancer of: Mother  at 77 due to emphysema, maternal great grandmother, maternal first cousin\par PCP: Dr. Abdalla/PREM Vu ([facility]) \par Pulmonologist: Dr. Meza (pulmonologist)  [de-identified] : Patient presents today for follow-up. \par Presenting to discuss about oncotype Dx. \par \par \par \par \par \par FMHx: Breast Cancer: Mother (age 62), M Grandmother \par \par 3/23/22 Pathology: R Axillary Lymph Node (0/2). L Axillary Lymph Node: No metastasis seen in 1 lymph node. Pending confirmation.  \par \par 02/04/22 MRI Breast: Bilateral large postsurgical seromas. No MRI evidence of malignancy.

## 2022-05-24 LAB
ALBUMIN SERPL ELPH-MCNC: 4.8 G/DL
ALP BLD-CCNC: 82 U/L
ALT SERPL-CCNC: 16 U/L
ANION GAP SERPL CALC-SCNC: 12 MMOL/L
AST SERPL-CCNC: 20 U/L
BILIRUB SERPL-MCNC: 0.7 MG/DL
BUN SERPL-MCNC: 20 MG/DL
CALCIUM SERPL-MCNC: 10.2 MG/DL
CHLORIDE SERPL-SCNC: 103 MMOL/L
CO2 SERPL-SCNC: 26 MMOL/L
CREAT SERPL-MCNC: 0.89 MG/DL
EGFR: 72 ML/MIN/1.73M2
HBV CORE IGM SER QL: NONREACTIVE
HBV SURFACE AB SER QL: NONREACTIVE
HBV SURFACE AB SERPL IA-ACNC: <3 MIU/ML
HBV SURFACE AG SER QL: NONREACTIVE
HCV AB SER QL: NONREACTIVE
HCV S/CO RATIO: 0.13 S/CO
MAGNESIUM SERPL-MCNC: 2 MG/DL
POTASSIUM SERPL-SCNC: 3.4 MMOL/L
PROT SERPL-MCNC: 7 G/DL
SODIUM SERPL-SCNC: 142 MMOL/L

## 2022-05-25 ENCOUNTER — NON-APPOINTMENT (OUTPATIENT)
Age: 67
End: 2022-05-25

## 2022-12-13 ENCOUNTER — NON-APPOINTMENT (OUTPATIENT)
Age: 67
End: 2022-12-13

## 2022-12-15 ENCOUNTER — NON-APPOINTMENT (OUTPATIENT)
Age: 67
End: 2022-12-15

## 2023-01-23 ENCOUNTER — TRANSCRIPTION ENCOUNTER (OUTPATIENT)
Age: 68
End: 2023-01-23